# Patient Record
Sex: FEMALE | Employment: FULL TIME | URBAN - METROPOLITAN AREA
[De-identification: names, ages, dates, MRNs, and addresses within clinical notes are randomized per-mention and may not be internally consistent; named-entity substitution may affect disease eponyms.]

---

## 2024-06-25 DIAGNOSIS — G47.33 OSA (OBSTRUCTIVE SLEEP APNEA): Primary | ICD-10-CM

## 2024-06-25 DIAGNOSIS — R53.83 LOSS OF ENERGY: ICD-10-CM

## 2024-06-25 DIAGNOSIS — R06.83 SNORING: ICD-10-CM

## 2024-06-25 DIAGNOSIS — G47.19 EXCESSIVE DAYTIME SLEEPINESS: ICD-10-CM

## 2024-06-29 ENCOUNTER — HOSPITAL ENCOUNTER (EMERGENCY)
Facility: HOSPITAL | Age: 44
Discharge: HOME | End: 2024-06-29
Attending: EMERGENCY MEDICINE
Payer: COMMERCIAL

## 2024-06-29 VITALS
HEIGHT: 60 IN | OXYGEN SATURATION: 97 % | BODY MASS INDEX: 39.27 KG/M2 | DIASTOLIC BLOOD PRESSURE: 81 MMHG | HEART RATE: 78 BPM | RESPIRATION RATE: 18 BRPM | TEMPERATURE: 99 F | SYSTOLIC BLOOD PRESSURE: 152 MMHG | WEIGHT: 200 LBS

## 2024-06-29 DIAGNOSIS — S70.01XA CONTUSION OF RIGHT HIP, INITIAL ENCOUNTER: ICD-10-CM

## 2024-06-29 DIAGNOSIS — S01.01XA SCALP LACERATION, INITIAL ENCOUNTER: Primary | ICD-10-CM

## 2024-06-29 LAB
B-HCG UR QL: NEGATIVE
CTP QC/QA: YES

## 2024-06-29 PROCEDURE — 63600175 PHARM REV CODE 636 W HCPCS: Performed by: NURSE PRACTITIONER

## 2024-06-29 PROCEDURE — 96372 THER/PROPH/DIAG INJ SC/IM: CPT | Performed by: EMERGENCY MEDICINE

## 2024-06-29 PROCEDURE — 90471 IMMUNIZATION ADMIN: CPT | Performed by: NURSE PRACTITIONER

## 2024-06-29 PROCEDURE — 12004 RPR S/N/AX/GEN/TRK7.6-12.5CM: CPT

## 2024-06-29 PROCEDURE — 63600175 PHARM REV CODE 636 W HCPCS: Performed by: EMERGENCY MEDICINE

## 2024-06-29 PROCEDURE — 99285 EMERGENCY DEPT VISIT HI MDM: CPT | Mod: 25

## 2024-06-29 PROCEDURE — 81025 URINE PREGNANCY TEST: CPT | Performed by: NURSE PRACTITIONER

## 2024-06-29 PROCEDURE — 90715 TDAP VACCINE 7 YRS/> IM: CPT | Performed by: NURSE PRACTITIONER

## 2024-06-29 PROCEDURE — 25000003 PHARM REV CODE 250: Performed by: EMERGENCY MEDICINE

## 2024-06-29 RX ORDER — HYDROCODONE BITARTRATE AND ACETAMINOPHEN 5; 325 MG/1; MG/1
1 TABLET ORAL EVERY 6 HOURS PRN
Qty: 8 TABLET | Refills: 0 | Status: SHIPPED | OUTPATIENT
Start: 2024-06-29

## 2024-06-29 RX ORDER — KETOROLAC TROMETHAMINE 30 MG/ML
15 INJECTION, SOLUTION INTRAMUSCULAR; INTRAVENOUS
Status: COMPLETED | OUTPATIENT
Start: 2024-06-29 | End: 2024-06-29

## 2024-06-29 RX ORDER — LIDOCAINE HYDROCHLORIDE 10 MG/ML
10 INJECTION, SOLUTION EPIDURAL; INFILTRATION; INTRACAUDAL; PERINEURAL
Status: DISCONTINUED | OUTPATIENT
Start: 2024-06-29 | End: 2024-06-29 | Stop reason: HOSPADM

## 2024-06-29 RX ORDER — LIDOCAINE HYDROCHLORIDE AND EPINEPHRINE 10; 10 MG/ML; UG/ML
20 INJECTION, SOLUTION INFILTRATION; PERINEURAL
Status: COMPLETED | OUTPATIENT
Start: 2024-06-29 | End: 2024-06-29

## 2024-06-29 RX ADMIN — TETANUS TOXOID, REDUCED DIPHTHERIA TOXOID AND ACELLULAR PERTUSSIS VACCINE, ADSORBED 0.5 ML: 5; 2.5; 8; 8; 2.5 SUSPENSION INTRAMUSCULAR at 05:06

## 2024-06-29 RX ADMIN — LIDOCAINE HYDROCHLORIDE,EPINEPHRINE BITARTRATE 20 ML: 10; .01 INJECTION, SOLUTION INFILTRATION; PERINEURAL at 05:06

## 2024-06-29 RX ADMIN — KETOROLAC TROMETHAMINE 15 MG: 30 INJECTION, SOLUTION INTRAMUSCULAR at 05:06

## 2024-06-29 NOTE — FIRST PROVIDER EVALUATION
Emergency Department TeleTriage Encounter Note      CHIEF COMPLAINT    Chief Complaint   Patient presents with    Fall     Fell head first off a horse, no LOC, all right side is hurting and numb    Head Laceration       VITAL SIGNS   Initial Vitals [06/29/24 1346]   BP Pulse Resp Temp SpO2   133/89 89 18 98.6 °F (37 °C) 97 %      MAP       --            ALLERGIES    Review of patient's allergies indicates:  No Known Allergies    PROVIDER TRIAGE NOTE  44-year-old female presents to the ER after being thrown off of a horse.  She states she was on a horse back riding trial with several people, and another horse spooked her horse causing her course to start gap pain vastly.  She is unable to hold onto the rain and slid off a horse sideways, striking her right head on the nearby graft he body ground had sticks and branches everywhere.  She also reports striking her right hip and lower back on the ground as well.  She denies losing consciousness.  She has not on any blood thinners.  She states the horse did not take her pass her or trampled on top of her.  It did not knock her off.  It did not roll on top of her.  She denied noticing any bruising on her abdomen or back.  She denies any chest pain rib pain or shortness of breath.  She has a laceration to her right parietal scalp that she states bleeding was controlled with a rag bandage.     AAOx3, respirations even and non- labored, stable vitals, normal coloration of skin, sitting upright in triage chair, wound to right scalp noted with bloody rag on neck.           ORDERS  Labs Reviewed - No data to display    ED Orders (720h ago, onward)      None              Virtual Visit Note: The provider triage portion of this emergency department evaluation and documentation was performed via [employer], a HIPAA-compliant telemedicine application, in concert with a tele-presenter in the room. A face to face patient evaluation with one of my colleagues will occur once the  patient is placed in an emergency department room.      DISCLAIMER: This note was prepared with [employer] voice recognition transcription software. Garbled syntax, mangled pronouns, and other bizarre constructions may be attributed to that software system.

## 2024-06-29 NOTE — ED PROVIDER NOTES
Encounter Date: 6/29/2024       History     Chief Complaint   Patient presents with    Fall     Fell head first off a horse, no LOC, all right side is hurting and numb    Head Laceration     44-year-old female history of chronic back pain presents to the ER with a head injury after falling off horse.  She was on a trail ride and other horse spooked which then cause hers to spooke.  She fell off as the horse was galloping. Hit head on something. C/o mild HA and R low back and R hip pain. No other complaints.  Surgical history of tubal ligation.  No chest pain no shortness of breath no left-sided complaints.  There is some bleeding from the scalp.    The history is provided by the patient.     Review of patient's allergies indicates:  No Known Allergies  No past medical history on file.  No past surgical history on file.  No family history on file.     Review of Systems   Constitutional:  Negative for diaphoresis.   HENT:  Negative for facial swelling.    Eyes:  Negative for pain.   Respiratory:  Negative for shortness of breath.    Cardiovascular:  Negative for chest pain.   Gastrointestinal:  Negative for abdominal pain.   Genitourinary:  Negative for flank pain.   Musculoskeletal:  Positive for arthralgias and back pain. Negative for neck pain.   Skin:  Negative for wound.   Neurological:  Positive for headaches.   Hematological:  Does not bruise/bleed easily.   Psychiatric/Behavioral:  Negative for confusion.    All other systems reviewed and are negative.      Physical Exam     Initial Vitals [06/29/24 1346]   BP Pulse Resp Temp SpO2   133/89 89 18 98.6 °F (37 °C) 97 %      MAP       --         Physical Exam    Nursing note and vitals reviewed.  Constitutional: She appears well-developed and well-nourished. She is not diaphoretic.  Non-toxic appearance. She does not have a sickly appearance. She does not appear ill. No distress.   HENT:   Head: Normocephalic. Head is with laceration. Head is without raccoon's  eyes, without Mcclendon's sign, without abrasion, without right periorbital erythema and without left periorbital erythema.       8 cm scalp laceration parietal region   Eyes: EOM are normal.   Neck: Neck supple.   Normal range of motion.   Full passive range of motion without pain.     Cardiovascular:  Normal rate, regular rhythm and normal heart sounds.           Pulmonary/Chest: Breath sounds normal. No respiratory distress. She has no wheezes. She has no rhonchi. She has no rales.   Abdominal: Abdomen is soft. She exhibits no distension. There is no abdominal tenderness. There is no rebound and no guarding.   Musculoskeletal:         General: No edema. Normal range of motion.      Right shoulder: Normal.      Left shoulder: Normal.      Right elbow: Normal.      Left elbow: Normal.      Right wrist: Normal.      Left wrist: Normal.      Cervical back: Full passive range of motion without pain, normal range of motion and neck supple. No rigidity. No spinous process tenderness or muscular tenderness. Normal range of motion.      Thoracic back: Normal.      Lumbar back: Normal.        Back:       Right hip: Tenderness and bony tenderness present.      Left hip: Normal.      Right knee: Normal.      Left knee: Normal.      Right ankle: Normal.      Left ankle: Normal.        Legs:       Comments: Right hip contusion     Neurological: She is alert and oriented to person, place, and time.   Skin: Skin is warm and dry.   Psychiatric: She has a normal mood and affect. Her behavior is normal. Judgment and thought content normal.         ED Course   Lac Repair    Date/Time: 6/29/2024 1:36 PM    Performed by: Matteo Candelario MD  Authorized by: Matteo Candelario MD    Consent:     Consent obtained:  Verbal    Consent given by:  Patient    Risks discussed:  Infection, pain, poor cosmetic result, retained foreign body and poor wound healing  Universal protocol:     Procedure explained and questions answered to patient or  proxy's satisfaction: yes      Relevant documents present and verified: yes      Test results available: yes      Imaging studies available: yes      Required blood products, implants, devices, and special equipment available: yes      Patient identity confirmed:  Provided demographic data  Anesthesia:     Anesthesia method:  Local infiltration    Local anesthetic:  Lidocaine 1% WITH epi  Laceration details:     Location:  Scalp    Scalp location:  R parietal    Length (cm):  10  Pre-procedure details:     Preparation:  Patient was prepped and draped in usual sterile fashion and imaging obtained to evaluate for foreign bodies  Exploration:     Limited defect created (wound extended): no      Hemostasis achieved with:  Direct pressure    Imaging outcome: foreign body not noted      Wound exploration: wound explored through full range of motion and entire depth of wound visualized      Wound extent: areolar tissue violated and fascia violated      Wound extent: no foreign bodies/material noted, no underlying fracture noted and no vascular damage noted      Contaminated: no    Treatment:     Amount of cleaning:  Extensive    Irrigation solution:  Sterile saline    Irrigation volume:  500ml    Irrigation method:  Syringe    Visualized foreign bodies/material removed: no      Debridement:  None    Undermining:  None    Scar revision: no    Skin repair:     Repair method:  Staples    Number of staples:  8  Approximation:     Approximation:  Close  Repair type:     Repair type:  Simple  Post-procedure details:     Dressing:  Open (no dressing)    Labs Reviewed   POCT URINE PREGNANCY - Normal          Imaging Results              X-Ray Lumbar Spine Ap And Lateral (Final result)  Result time 06/29/24 15:42:39      Final result by Daisy Houston MD (06/29/24 15:42:39)                   Impression:      No acute compression or subluxation.      Electronically signed by: Daisy Houston  MD  Date:    06/29/2024  Time:    15:42               Narrative:    EXAMINATION:  XR LUMBAR SPINE AP AND LATERAL    CLINICAL HISTORY:  injury;    TECHNIQUE:  AP, lateral and spot images were performed of the lumbar spine.    COMPARISON:  None    FINDINGS:  Straightening of the usual lumbar lordosis.  Vertebral body heights and alignment maintained without acute compression or subluxation.  Small osteophytes are seen on vertebral bodies.                                       X-Ray Hip 2 or 3 views Right with Pelvis when performed (Final result)  Result time 06/29/24 15:37:44      Final result by Daisy Houston MD (06/29/24 15:37:44)                   Impression:      Above      Electronically signed by: Daisy Houston MD  Date:    06/29/2024  Time:    15:37               Narrative:    EXAMINATION:  XR HIP WITH PELVIS WHEN PERFORMED 2 OR 3 VIEWS RIGHT    CLINICAL HISTORY:  fall;    TECHNIQUE:  AP view of the pelvis and frog leg lateral view of the right hip were performed.    COMPARISON:  None    FINDINGS:  No acute fracture or dislocation.    Peritendinous soft tissue calcification noted lateral to the greater trochanter of the left femur.                                       CT Cervical Spine Without Contrast (Final result)  Result time 06/29/24 15:13:26      Final result by Daisy Houston MD (06/29/24 15:13:26)                   Impression:      No acute fracture, compression or subluxation.  Findings as detailed      Electronically signed by: Daisy Houston MD  Date:    06/29/2024  Time:    15:13               Narrative:    EXAMINATION:  CT CERVICAL SPINE WITHOUT CONTRAST    CLINICAL HISTORY:  Polytrauma, blunt;    TECHNIQUE:  Low dose axial images, sagittal and coronal reformations were performed though the cervical spine.  Contrast was not administered.    COMPARISON:  None    FINDINGS:  There is reversal of the usual cervical lordosis.  Severe degenerative changes.  Multilevel disc space narrowing.   Vertebral body heights and alignment maintained without acute compression or subluxation and there is no acute fracture.    Visualized lung AP apices are expanded    Lack of intrathecal or IV contrast limit evaluation of spinal canal contents and if evaluation of spinal canal contents is indicated clinically suggest MRI.  There is however no prevertebral soft tissue swelling or hematoma or epidural hematoma.    Curvy linear 5 mm calcification projecting in the right lobe of the thyroid gland suggesting peripherally calcified thyroid nodule.  Visualized lung apices are expanded.    Visualized appears moderately severe right neural foraminal narrowing C4-C5 and C5-C6 and bilaterally C6-C7 with posterior osteophytes and disc osteophyte complexes appears mild spinal canal narrowing multiple levels particularly C5-C6.    There is calcification in the ligamentum nuchae.                                       CT Head Without Contrast (Final result)  Result time 06/29/24 14:58:49      Final result by Daisy Houston MD (06/29/24 14:58:49)                   Impression:      No acute intracranial findings    Right parietal scalp laceration and soft tissue air      Electronically signed by: Daisy Houston MD  Date:    06/29/2024  Time:    14:58               Narrative:    EXAMINATION:  CT HEAD WITHOUT CONTRAST    CLINICAL HISTORY:  Head trauma, moderate-severe;    TECHNIQUE:  Low dose axial images were obtained through the head.  Coronal and sagittal reformations were also performed. Contrast was not administered.    COMPARISON:  None.    FINDINGS:  Ventricles, sulci, fissure, white matter are unremarkable in appearance for the patient's age. There is no acute intracranial hemorrhage.  There is no intracranial mass effect.  There is no acute major vascular territory infarct. Note is made that MRI is typically more sensitive than CT particular for detection of early or small nonhemorrhagic infarct. The calvarium appears  intact, mastoids well pneumatized, visualized paranasal sinuses essentially clear.Right parietal scalp laceration and soft tissue air.                                       Medications   LIDOcaine (PF) 10 mg/ml (1%) injection 100 mg (100 mg Infiltration Not Given 6/29/24 1430)   Tdap (BOOSTRIX) vaccine injection 0.5 mL (0.5 mLs Intramuscular Given 6/29/24 1738)   ketorolac injection 15 mg (15 mg Intramuscular Given 6/29/24 1734)   LIDOcaine-EPINEPHrine 1%-1:100,000 injection 20 mL (20 mLs Intradermal Given 6/29/24 1741)     Medical Decision Making  1846 44-year-old female presents to the emergency room with a scalp laceration and right hip pain after falling off a horse on a trail ride.  She has a large scalp laceration that was irrigated and repaired with staples.  Tetanus was updated.  CT of the cervical spine and head are otherwise negative except for the laceration.  Right hip x-ray unremarkable.  She does have a hip contusion.  She has full range of motion active and passive in the right hip but has tenderness on exam to the greater trochanter in the location of the contusion.  She has chronic lumbar pain, x-rays of the lumbar spine are negative.  She will be discharged home for staple removal in 10-14 days I will give a small amount of p.o. hydrocodone on discharge.  She has no chest pain no shortness of breath no abdominal pain no other injuries. [EF]      Amount and/or Complexity of Data Reviewed  Radiology:  Decision-making details documented in ED Course.    Risk  Prescription drug management.               ED Course as of 06/29/24 1953   Sat Jun 29, 2024   1615 X-Ray Lumbar Spine Ap And Lateral [EF]   1615 X-Ray Hip 2 or 3 views Right with Pelvis when performed [EF]   1615 CT Cervical Spine Without Contrast [EF]   1615 CT Head Without Contrast [EF]   1846 44-year-old female presents to the emergency room with a scalp laceration and right hip pain after falling off a horse on a trail ride.  She has a large  scalp laceration that was irrigated and repaired with staples.  Tetanus was updated.  CT of the cervical spine and head are otherwise negative except for the laceration.  Right hip x-ray unremarkable.  She does have a hip contusion.  She has full range of motion active and passive in the right hip but has tenderness on exam to the greater trochanter in the location of the contusion.  She has chronic lumbar pain, x-rays of the lumbar spine are negative.  She will be discharged home for staple removal in 10-14 days I will give a small amount of p.o. hydrocodone on discharge.  She has no chest pain no shortness of breath no abdominal pain no other injuries. [EF]      ED Course User Index  [EF] Matteo Candelario MD                           Clinical Impression:  Final diagnoses:  [S01.01XA] Scalp laceration, initial encounter (Primary)  [S70.01XA] Contusion of right hip, initial encounter          ED Disposition Condition    Discharge Stable          ED Prescriptions       Medication Sig Dispense Start Date End Date Auth. Provider    HYDROcodone-acetaminophen (NORCO) 5-325 mg per tablet Take 1 tablet by mouth every 6 (six) hours as needed. 8 tablet 6/29/2024 -- Matteo Candelario MD          Follow-up Information       Follow up With Specialties Details Why Contact Info Additional Information    [facility]  For staple removal [address] [place] [address] [address] [place] [phone] 1st floor             Matteo Candelario MD  06/29/24 1953

## 2024-07-13 ENCOUNTER — HOSPITAL ENCOUNTER (EMERGENCY)
Facility: HOSPITAL | Age: 44
Discharge: HOME | End: 2024-07-13
Attending: EMERGENCY MEDICINE
Payer: COMMERCIAL

## 2024-07-13 VITALS
RESPIRATION RATE: 18 BRPM | OXYGEN SATURATION: 98 % | TEMPERATURE: 98 F | HEART RATE: 92 BPM | SYSTOLIC BLOOD PRESSURE: 131 MMHG | DIASTOLIC BLOOD PRESSURE: 75 MMHG

## 2024-07-13 DIAGNOSIS — S70.01XD TRAUMATIC HEMATOMA OF RIGHT HIP, SUBSEQUENT ENCOUNTER: ICD-10-CM

## 2024-07-13 DIAGNOSIS — Z48.02: Primary | ICD-10-CM

## 2024-07-13 PROCEDURE — 99281 EMR DPT VST MAYX REQ PHY/QHP: CPT

## 2024-07-13 NOTE — Clinical Note
"Merle Jacksonrosanna Alvarez was seen and treated in our emergency department on 7/13/2024.  She may return to work on 07/20/2024.       If you have any questions or concerns, please don't hesitate to call.      Jolene Nathan PA-C"

## 2024-07-13 NOTE — ED PROVIDER NOTES
Encounter Date: 7/13/2024       History     Chief Complaint   Patient presents with    Suture / Staple Removal     Merle Alvarez is a 44 y.o. female presenting for evaluation of staple removal from her scalp.  Patient states she sustained the scalp laceration 2 weeks ago after falling off of a horse.  She feels as if the wound is healing well.  She has noticed no drainage or bleeding.  No fever, no chills.  No persistent headaches.  In addition, she feels as if the ecchymosis and bruising to her right hip is resolving, but has also noticed persistent swelling.  She is able to walk without difficulty.  She has no past medical history on file.      The history is provided by the patient.     Review of patient's allergies indicates:  No Known Allergies  No past medical history on file.  No past surgical history on file.  No family history on file.     Review of Systems   Constitutional:  Negative for chills and fever.   Respiratory:  Negative for cough, chest tightness, shortness of breath and wheezing.    Cardiovascular:  Negative for chest pain and palpitations.   Musculoskeletal:  Positive for joint swelling. Negative for arthralgias, back pain, myalgias, neck pain and neck stiffness.   Skin:  Positive for wound. Negative for color change, pallor and rash.   Neurological:  Negative for weakness and numbness.   Hematological:  Does not bruise/bleed easily.       Physical Exam     Initial Vitals [07/13/24 1612]   BP Pulse Resp Temp SpO2   131/75 92 18 97.9 °F (36.6 °C) 98 %      MAP       --         Physical Exam    Nursing note and vitals reviewed.  Constitutional: She appears well-developed and well-nourished. She is not diaphoretic. No distress.   HENT:   Head: Normocephalic.   Intact staples noted to right-sided scalp, without erythema, bleeding or discharge.   Neck: Neck supple.   Normal range of motion.  Cardiovascular:  Normal rate, regular rhythm, normal heart sounds and intact distal pulses.     Exam reveals  no gallop and no friction rub.       No murmur heard.  Pulmonary/Chest: Breath sounds normal. No respiratory distress. She has no wheezes. She has no rhonchi. She has no rales.   Musculoskeletal:         General: No tenderness or edema. Normal range of motion.      Cervical back: Normal range of motion and neck supple.      Comments: Moderately sized area of residual swelling noted to right lateral hip and buttocks.  No ecchymosis noted.  No erythema.  No decreased range of motion, decreased strength or loss of sensation to right lower extremity.     Neurological: She is alert and oriented to person, place, and time. She has normal strength. No sensory deficit.   Skin: Skin is warm and dry. No rash and no abscess noted. No erythema.   Psychiatric: She has a normal mood and affect.         ED Course   Suture Removal    Date/Time: 7/13/2024 4:09 PM  Location procedure was performed: [facility] EMERGENCY DEPARTMENT    Performed by: Jolene Nathan PA-C  Authorized by: Salvatore Pena MD  Body area: head/neck  Location details: scalp  Wound Appearance: clean, well healed, nontender, no drainage and nonpurulent  Staples Removed: 8  Post-removal: no dressing applied  Facility: sutures placed in this facility  Patient tolerance: Patient tolerated the procedure well with no immediate complications        Labs Reviewed - No data to display       Imaging Results    None          Medications - No data to display  Medical Decision Making  Differential diagnosis:  Staple removal  Wound recheck  Right hip hematoma    Pt emergently evaluated here in the ED.    Patient is well-appearing on exam.  She tolerated the staple removal from her scalp well.  Wound is well healed without active bleeding or discharge.  The right hip hematoma seems to be resolving; however, there is some residual swelling, but no concerning findings of erythema, warmth to indicate underlying infection.  She is ambulatory on exam.    Risk  OTC drugs.                                       Clinical Impression:  Final diagnoses:  [Z48.02] Encounter for removal of staples (Primary)  [S70.01XD] Traumatic hematoma of right hip, subsequent encounter          ED Disposition Condition    Discharge Stable          ED Prescriptions    None       Follow-up Information       Follow up With Specialties Details Why Contact Info Additional Information    [facility] ED Emergency Medicine  As needed, If symptoms worsen [address] [place] [address] [address] [place] [phone] 1st floor             Jolene Natahn PA-C  07/13/24 2537

## 2024-07-18 ENCOUNTER — PROCEDURE VISIT (OUTPATIENT)
Dept: SLEEP MEDICINE | Facility: HOSPITAL | Age: 44
End: 2024-07-18
Attending: INTERNAL MEDICINE
Payer: COMMERCIAL

## 2024-07-18 DIAGNOSIS — G47.19 EXCESSIVE DAYTIME SLEEPINESS: ICD-10-CM

## 2024-07-18 DIAGNOSIS — R06.83 SNORING: ICD-10-CM

## 2024-07-18 DIAGNOSIS — G47.33 OSA (OBSTRUCTIVE SLEEP APNEA): ICD-10-CM

## 2024-07-18 DIAGNOSIS — R53.83 LOSS OF ENERGY: ICD-10-CM

## 2024-07-18 PROCEDURE — 95806 SLEEP STUDY UNATT&RESP EFFT: CPT

## 2024-10-17 ENCOUNTER — PATIENT MESSAGE (OUTPATIENT)
Dept: RESEARCH | Facility: HOSPITAL | Age: 44
End: 2024-10-17